# Patient Record
Sex: MALE | Race: WHITE | Employment: FULL TIME | URBAN - METROPOLITAN AREA
[De-identification: names, ages, dates, MRNs, and addresses within clinical notes are randomized per-mention and may not be internally consistent; named-entity substitution may affect disease eponyms.]

---

## 2022-01-17 ENCOUNTER — OFFICE VISIT (OUTPATIENT)
Dept: INTERNAL MEDICINE CLINIC | Age: 32
End: 2022-01-17
Payer: COMMERCIAL

## 2022-01-17 VITALS
BODY MASS INDEX: 29.19 KG/M2 | DIASTOLIC BLOOD PRESSURE: 92 MMHG | HEIGHT: 67 IN | RESPIRATION RATE: 14 BRPM | HEART RATE: 74 BPM | SYSTOLIC BLOOD PRESSURE: 148 MMHG | OXYGEN SATURATION: 98 % | WEIGHT: 186 LBS

## 2022-01-17 DIAGNOSIS — R06.83 SNORING: ICD-10-CM

## 2022-01-17 DIAGNOSIS — Z11.4 ENCOUNTER FOR SCREENING FOR HIV: ICD-10-CM

## 2022-01-17 DIAGNOSIS — Z13.1 SCREENING FOR DIABETES MELLITUS (DM): ICD-10-CM

## 2022-01-17 DIAGNOSIS — F41.9 ANXIETY: ICD-10-CM

## 2022-01-17 DIAGNOSIS — Z00.00 ANNUAL PHYSICAL EXAM: Primary | ICD-10-CM

## 2022-01-17 DIAGNOSIS — Z13.220 ENCOUNTER FOR LIPID SCREENING FOR CARDIOVASCULAR DISEASE: ICD-10-CM

## 2022-01-17 DIAGNOSIS — Z11.59 ENCOUNTER FOR HEPATITIS C SCREENING TEST FOR LOW RISK PATIENT: ICD-10-CM

## 2022-01-17 DIAGNOSIS — G47.19 EXCESSIVE DAYTIME SLEEPINESS: ICD-10-CM

## 2022-01-17 DIAGNOSIS — Z13.6 ENCOUNTER FOR LIPID SCREENING FOR CARDIOVASCULAR DISEASE: ICD-10-CM

## 2022-01-17 DIAGNOSIS — R53.83 OTHER FATIGUE: ICD-10-CM

## 2022-01-17 DIAGNOSIS — R03.0 ELEVATED BLOOD PRESSURE READING: ICD-10-CM

## 2022-01-17 DIAGNOSIS — Z20.822 SUSPECTED COVID-19 VIRUS INFECTION: ICD-10-CM

## 2022-01-17 PROCEDURE — 99385 PREV VISIT NEW AGE 18-39: CPT | Performed by: INTERNAL MEDICINE

## 2022-01-17 SDOH — ECONOMIC STABILITY: FOOD INSECURITY: WITHIN THE PAST 12 MONTHS, THE FOOD YOU BOUGHT JUST DIDN'T LAST AND YOU DIDN'T HAVE MONEY TO GET MORE.: NEVER TRUE

## 2022-01-17 SDOH — ECONOMIC STABILITY: FOOD INSECURITY: WITHIN THE PAST 12 MONTHS, YOU WORRIED THAT YOUR FOOD WOULD RUN OUT BEFORE YOU GOT MONEY TO BUY MORE.: NEVER TRUE

## 2022-01-17 ASSESSMENT — PATIENT HEALTH QUESTIONNAIRE - PHQ9
1. LITTLE INTEREST OR PLEASURE IN DOING THINGS: 0
SUM OF ALL RESPONSES TO PHQ9 QUESTIONS 1 & 2: 0
2. FEELING DOWN, DEPRESSED OR HOPELESS: 0
SUM OF ALL RESPONSES TO PHQ QUESTIONS 1-9: 0

## 2022-01-17 ASSESSMENT — LIFESTYLE VARIABLES: HOW OFTEN DO YOU HAVE A DRINK CONTAINING ALCOHOL: NEVER

## 2022-01-17 ASSESSMENT — SOCIAL DETERMINANTS OF HEALTH (SDOH): HOW HARD IS IT FOR YOU TO PAY FOR THE VERY BASICS LIKE FOOD, HOUSING, MEDICAL CARE, AND HEATING?: NOT HARD AT ALL

## 2022-01-17 NOTE — PROGRESS NOTES
History and Physical  [facility]  Internal Medicine  Charisma Lee MD      Aniket Buck  YOB: 1990    Date of Service:  1/17/2022    Vitals:    01/17/22 1036   BP: (!) 148/92   Pulse: 74   Resp: 14   SpO2: 98%   Weight: 186 lb (84.4 kg)   Height: 5' 7\" (1.702 m)     Body mass index is 29.13 kg/m². Wt Readings from Last 3 Encounters:   01/17/22 186 lb (84.4 kg)     BP Readings from Last 3 Encounters:   01/17/22 (!) 148/92        Chief Page Flor is a 27 y.o. male who presents for complete physical examination. Chief Complaint   Patient presents with    New Patient     [address] [place] [address], requesting blood work        HPI:     Takes 6mg of melatonin and some essential oils to help sleep which works. Lots of anxiety and difficulty falling asleep. Gets panic attacks at bedtime. Able to go back to sleep when wakes up in the middle of the night. Weight fluctuates a lot - thinks baseline weight is 155. Some fatigue during the day. Has a 3year old child. Does snore. There is no problem list on file for this patient. No Known Allergies  No outpatient medications have been marked as taking for the 1/17/22 encounter (Office Visit) with Noemi Pena MD.       History reviewed. No pertinent past medical history. History reviewed. No pertinent surgical history. Family History   Problem Relation Age of Onset    Hypothyroidism Mother     Diabetes Father        Review of Systems:  A comprehensive review of systems was negative except for what was noted in the HPI. Physical Exam  Constitutional:       General: He is not in acute distress. Appearance: Normal appearance. He is well-developed. He is not diaphoretic. HENT:      Head: Normocephalic and atraumatic.       Right Ear: Tympanic membrane, ear canal and external ear normal.      Left Ear: Tympanic membrane, ear canal and external ear normal.      Nose: Nose normal.      Mouth/Throat:      Mouth: Mucous membranes are moist.      Pharynx: Oropharynx is clear. No oropharyngeal exudate or posterior oropharyngeal erythema. Eyes:      General: No scleral icterus. Conjunctiva/sclera: Conjunctivae normal.      Pupils: Pupils are equal, round, and reactive to light. Neck:      Thyroid: No thyromegaly. Vascular: No carotid bruit or JVD. Comments: No JVD at 90 deg  No thyromegaly  Cardiovascular:      Rate and Rhythm: Normal rate and regular rhythm. Pulses: Normal pulses. Heart sounds: Normal heart sounds. No murmur heard. No friction rub. No gallop. Pulmonary:      Effort: Pulmonary effort is normal.      Breath sounds: Normal breath sounds. No wheezing or rales. Abdominal:      General: Bowel sounds are normal. There is no distension. Palpations: Abdomen is soft. There is no mass. Tenderness: There is no abdominal tenderness. There is no guarding or rebound. Musculoskeletal:         General: No tenderness or deformity. Normal range of motion. Cervical back: Normal range of motion and neck supple. Right lower leg: No edema. Left lower leg: No edema. Lymphadenopathy:      Cervical: No cervical adenopathy. Skin:     General: Skin is warm and dry. Findings: No lesion or rash. Neurological:      General: No focal deficit present. Mental Status: He is alert and oriented to person, place, and time. Cranial Nerves: No cranial nerve deficit (grossly intact). Sensory: No sensory deficit. Motor: No weakness. Coordination: Coordination normal.      Gait: Gait normal.   Psychiatric:         Mood and Affect: Mood normal.         Behavior: Behavior normal.         Lipid panel: No results found for: CHOL, TRIG, HDL, LDLCALC, LDLDIRECT  Glucose: No results found for: GLUCOSE    No results found for this visit on 01/17/22.      Preventive Care:  Self-testicular exams: yes  Eye exam within the past 2 years: yes  Dental exam every 6months: no - no oral health concerns. Exercise: active job  Social History     Tobacco Use    Smoking status: Never Smoker    Smokeless tobacco: Never Used   Substance Use Topics    Alcohol use: Not Currently     Social History     Substance and Sexual Activity   Sexual Activity Yes    Partners: Female       Advance Directive: N, <no information>      There is no immunization history on file for this patient. Health Maintenance   Topic Date Due    Hepatitis C screen  Never done    Varicella vaccine (1 of 2 - 2-dose childhood series) Never done    COVID-19 Vaccine (1) Never done    Depression Screen  Never done    HIV screen  Never done    DTaP/Tdap/Td vaccine (1 - Tdap) Never done    Flu vaccine (1) Never done    Hepatitis A vaccine  Aged Out    Hepatitis B vaccine  Aged Out    Hib vaccine  Aged Out    Meningococcal (ACWY) vaccine  Aged Out    Pneumococcal 0-64 years Vaccine  Aged Out          Assessment/Plan:  1. Annual physical exam  Care gaps identified and addressed  Declines flu shot and COVID IMZ (wife had and he wants antibodies checked. Still will not get IMZ). Labs as below  Hep C and HIV screening x1  Lipids and A1C  Monitor BP at home on cuff - close follow up. Watch salt intake. Given daytime sleepiness, snoring, recent weight gain and elevated blood pressure will refer for sleep eval for possible TISHA  Healthy diet and exercise. Mental health discussed - wants to avoid medications if possible. Discussed progressive relaxation techniques and even trying to find a CBT therapist as this would likely be effective (resources given). He is resistant to the idea of prescription medication for this. -     CBC Auto Differential; Future  -     COMPREHENSIVE METABOLIC PANEL; Future  -     Lipid Panel; Future  -     Hemoglobin A1C; Future  -     TSH with Reflex; Future  -     Vitamin B12; Future  -     Vitamin D 25 Hydroxy; Future  -     HEPATITIS C ANTIBODY;  Future  -     HIV-1 AND HIV-2 ANTIBODIES; Future  2. Other fatigue  -     CBC Auto Differential; Future  -     COMPREHENSIVE METABOLIC PANEL; Future  -     TSH with Reflex; Future  -     Vitamin B12; Future  -     Vitamin D 25 Hydroxy; Future  -     [facility]iki [facility]ase, [facility], [facility]  3. Anxiety  -     CBC Auto Differential; Future  -     COMPREHENSIVE METABOLIC PANEL; Future  -     TSH with Reflex; Future  -     Vitamin B12; Future  -     Vitamin D 25 Hydroxy; Future  4. Screening for diabetes mellitus (DM)  -     Hemoglobin A1C; Future  5. Encounter for lipid screening for cardiovascular disease  -     Lipid Panel; Future  6. Suspected COVID-19 virus infection  -     Covid-19, Antibody, Total; Future  7. Encounter for screening for HIV  -     HIV-1 AND HIV-2 ANTIBODIES; Future  8. Encounter for hepatitis C screening test for low risk patient  -     HEPATITIS C ANTIBODY; Future  9. Excessive daytime sleepiness  -     Viki Aase, MD, [facility]  10. Snoring  -     Viki Aase, MD, [facility]  11. Elevated blood pressure reading  -     Viki Aase, MD, [facility]       Return in about 3 months (around 4/17/2022) for HTN.

## 2022-01-17 NOTE — PATIENT INSTRUCTIONS
Watch salt in diet  Monitor blood pressure at home - keep readings    Guided meditations or progressive relaxation techniques - can look up on youtube    [email or web address] - search for psychotherapist and plug in insurance information

## 2023-07-25 ENCOUNTER — OFFICE VISIT (OUTPATIENT)
Dept: INTERNAL MEDICINE CLINIC | Age: 33
End: 2023-07-25
Payer: COMMERCIAL

## 2023-07-25 VITALS
HEIGHT: 67 IN | RESPIRATION RATE: 14 BRPM | SYSTOLIC BLOOD PRESSURE: 112 MMHG | DIASTOLIC BLOOD PRESSURE: 78 MMHG | BODY MASS INDEX: 27.94 KG/M2 | OXYGEN SATURATION: 98 % | HEART RATE: 76 BPM | WEIGHT: 178 LBS

## 2023-07-25 DIAGNOSIS — Z13.1 SCREENING FOR DIABETES MELLITUS (DM): ICD-10-CM

## 2023-07-25 DIAGNOSIS — Z13.220 ENCOUNTER FOR LIPID SCREENING FOR CARDIOVASCULAR DISEASE: ICD-10-CM

## 2023-07-25 DIAGNOSIS — R53.83 OTHER FATIGUE: Primary | ICD-10-CM

## 2023-07-25 DIAGNOSIS — Z13.6 ENCOUNTER FOR LIPID SCREENING FOR CARDIOVASCULAR DISEASE: ICD-10-CM

## 2023-07-25 DIAGNOSIS — R53.83 OTHER FATIGUE: ICD-10-CM

## 2023-07-25 PROCEDURE — 99214 OFFICE O/P EST MOD 30 MIN: CPT | Performed by: INTERNAL MEDICINE

## 2023-07-25 SDOH — ECONOMIC STABILITY: INCOME INSECURITY: HOW HARD IS IT FOR YOU TO PAY FOR THE VERY BASICS LIKE FOOD, HOUSING, MEDICAL CARE, AND HEATING?: NOT HARD AT ALL

## 2023-07-25 SDOH — ECONOMIC STABILITY: FOOD INSECURITY: WITHIN THE PAST 12 MONTHS, THE FOOD YOU BOUGHT JUST DIDN'T LAST AND YOU DIDN'T HAVE MONEY TO GET MORE.: NEVER TRUE

## 2023-07-25 SDOH — ECONOMIC STABILITY: HOUSING INSECURITY
IN THE LAST 12 MONTHS, WAS THERE A TIME WHEN YOU DID NOT HAVE A STEADY PLACE TO SLEEP OR SLEPT IN A SHELTER (INCLUDING NOW)?: NO

## 2023-07-25 SDOH — ECONOMIC STABILITY: FOOD INSECURITY: WITHIN THE PAST 12 MONTHS, YOU WORRIED THAT YOUR FOOD WOULD RUN OUT BEFORE YOU GOT MONEY TO BUY MORE.: NEVER TRUE

## 2023-07-25 ASSESSMENT — PATIENT HEALTH QUESTIONNAIRE - PHQ9
SUM OF ALL RESPONSES TO PHQ QUESTIONS 1-9: 0
1. LITTLE INTEREST OR PLEASURE IN DOING THINGS: 0
SUM OF ALL RESPONSES TO PHQ QUESTIONS 1-9: 0
SUM OF ALL RESPONSES TO PHQ QUESTIONS 1-9: 0
2. FEELING DOWN, DEPRESSED OR HOPELESS: 0
SUM OF ALL RESPONSES TO PHQ QUESTIONS 1-9: 0
SUM OF ALL RESPONSES TO PHQ9 QUESTIONS 1 & 2: 0

## 2023-07-25 NOTE — PROGRESS NOTES
Right Ear: External ear normal.      Left Ear: External ear normal.      Nose: Nose normal.      Mouth/Throat:      Mouth: Mucous membranes are moist.      Pharynx: Oropharynx is clear. Eyes:      General: No scleral icterus. Conjunctiva/sclera: Conjunctivae normal.   Cardiovascular:      Rate and Rhythm: Normal rate and regular rhythm. Pulses: Normal pulses. Heart sounds: Normal heart sounds. No murmur heard. No friction rub. No gallop. Pulmonary:      Effort: Pulmonary effort is normal.      Breath sounds: Normal breath sounds. No wheezing, rhonchi or rales. Abdominal:      General: Abdomen is flat. Bowel sounds are normal.      Palpations: Abdomen is soft. Musculoskeletal:         General: Normal range of motion. Cervical back: Normal range of motion and neck supple. Right lower leg: No edema. Left lower leg: No edema. Skin:     General: Skin is warm and dry. Findings: No rash. Neurological:      General: No focal deficit present. Mental Status: He is alert. Mental status is at baseline. Coordination: Coordination normal.      Gait: Gait normal.   Psychiatric:         Mood and Affect: Mood normal.         Behavior: Behavior normal.         On this date 7/25/2023 I have spent 30 minutes reviewing previous notes, test results and face to face with the patient discussing the diagnosis and importance of compliance with the treatment plan as well as documenting on the day of the visit. An electronic signature was used to authenticate this note.     --James Portillo MD

## 2023-07-26 LAB
25(OH)D3 SERPL-MCNC: 27.2 NG/ML
ALBUMIN SERPL-MCNC: 4.8 G/DL (ref 3.4–5)
ALBUMIN/GLOB SERPL: 1.8 {RATIO} (ref 1.1–2.2)
ALP SERPL-CCNC: 71 U/L (ref 40–129)
ALT SERPL-CCNC: 20 U/L (ref 10–40)
ANION GAP SERPL CALCULATED.3IONS-SCNC: 11 MMOL/L (ref 3–16)
AST SERPL-CCNC: 24 U/L (ref 15–37)
BASOPHILS # BLD: 0.1 K/UL (ref 0–0.2)
BASOPHILS NFR BLD: 0.8 %
BILIRUB SERPL-MCNC: 0.3 MG/DL (ref 0–1)
BUN SERPL-MCNC: 17 MG/DL (ref 7–20)
CALCIUM SERPL-MCNC: 10.1 MG/DL (ref 8.3–10.6)
CHLORIDE SERPL-SCNC: 106 MMOL/L (ref 99–110)
CHOLEST SERPL-MCNC: 221 MG/DL (ref 0–199)
CO2 SERPL-SCNC: 27 MMOL/L (ref 21–32)
CREAT SERPL-MCNC: 1.3 MG/DL (ref 0.9–1.3)
DEPRECATED RDW RBC AUTO: 14.5 % (ref 12.4–15.4)
EOSINOPHIL # BLD: 0.2 K/UL (ref 0–0.6)
EOSINOPHIL NFR BLD: 2.4 %
EST. AVERAGE GLUCOSE BLD GHB EST-MCNC: 102.5 MG/DL
GFR SERPLBLD CREATININE-BSD FMLA CKD-EPI: >60 ML/MIN/{1.73_M2}
GLUCOSE SERPL-MCNC: 92 MG/DL (ref 70–99)
HBA1C MFR BLD: 5.2 %
HCT VFR BLD AUTO: 46.5 % (ref 40.5–52.5)
HDLC SERPL-MCNC: 59 MG/DL (ref 40–60)
HGB BLD-MCNC: 15.7 G/DL (ref 13.5–17.5)
LDLC SERPL CALC-MCNC: 128 MG/DL
LYMPHOCYTES # BLD: 1.7 K/UL (ref 1–5.1)
LYMPHOCYTES NFR BLD: 27.2 %
MCH RBC QN AUTO: 27.6 PG (ref 26–34)
MCHC RBC AUTO-ENTMCNC: 33.7 G/DL (ref 31–36)
MCV RBC AUTO: 81.7 FL (ref 80–100)
MONOCYTES # BLD: 0.6 K/UL (ref 0–1.3)
MONOCYTES NFR BLD: 9.2 %
NEUTROPHILS # BLD: 3.9 K/UL (ref 1.7–7.7)
NEUTROPHILS NFR BLD: 60.4 %
PLATELET # BLD AUTO: 258 K/UL (ref 135–450)
PMV BLD AUTO: 8.7 FL (ref 5–10.5)
POTASSIUM SERPL-SCNC: 4.3 MMOL/L (ref 3.5–5.1)
PROT SERPL-MCNC: 7.5 G/DL (ref 6.4–8.2)
RBC # BLD AUTO: 5.69 M/UL (ref 4.2–5.9)
SODIUM SERPL-SCNC: 144 MMOL/L (ref 136–145)
TRIGL SERPL-MCNC: 170 MG/DL (ref 0–150)
TSH SERPL DL<=0.005 MIU/L-ACNC: 1.06 UIU/ML (ref 0.27–4.2)
VIT B12 SERPL-MCNC: 502 PG/ML (ref 211–911)
VLDLC SERPL CALC-MCNC: 34 MG/DL
WBC # BLD AUTO: 6.4 K/UL (ref 4–11)

## 2023-08-27 ENCOUNTER — PATIENT MESSAGE (OUTPATIENT)
Dept: INTERNAL MEDICINE CLINIC | Age: 33
End: 2023-08-27

## 2023-08-27 DIAGNOSIS — R19.7 DIARRHEA, UNSPECIFIED TYPE: Primary | ICD-10-CM

## 2023-09-05 DIAGNOSIS — R19.7 DIARRHEA, UNSPECIFIED TYPE: Primary | ICD-10-CM

## 2023-09-05 NOTE — PROGRESS NOTES
[facility] ENDOSCOPY COLONOSCOPY PRE-OPERATIVE INSTRUCTIONS    Procedure date_9/7/2023________  Arrival time___1030_________          Surgery time___1130_________       Clear liquids the day before the procedure. Do not eat or drink anything within 5 hours of your procedure. This includes water chewing gum, mints and ice chips. You may brush your teeth and gargle the morning of your surgery, but do not swallow the water    You may be asked to stop blood thinners such as Coumadin, Plavix, Fragmin, Lovenox, etc., or any anti-inflammatories such as:  Aspirin, Ibuprofen, Advil, Naproxen prior to your procedure. We also ask that you stop any OTC medications such as fish oil, vitamin E, glucosamine, garlic, Multivitamins, COQ 10, etc.    You must make arrangements for a responsible adult to arrive with you and stay in our waiting area during your procedure. They will also need to take you home after your procedure. For your safety you will not be allowed to leave alone or drive yourself home. Also for your safety, it is strongly suggested that someone stay with you the first 24 hours after your procedure. For your comfort, please wear simple loose fitting clothing to the center. Please do not bring valuables. If you have a living will and a durable power of  for healthcare, please bring in a copy.      You will need to bring a photo ID and insurance card    Our goal is to provide you with excellent care so if you have any questions, please contact us at the [facility] at [phone]         Please note these are generalized instructions for all colonoscopy cases, you may be provided with more specific instructions if necessary

## 2023-09-06 ENCOUNTER — ANESTHESIA EVENT (OUTPATIENT)
Dept: ENDOSCOPY | Age: 33
End: 2023-09-06
Payer: COMMERCIAL

## 2023-09-07 ENCOUNTER — HOSPITAL ENCOUNTER (OUTPATIENT)
Age: 33
Setting detail: OUTPATIENT SURGERY
Discharge: HOME | End: 2023-09-07
Attending: INTERNAL MEDICINE | Admitting: INTERNAL MEDICINE
Payer: COMMERCIAL

## 2023-09-07 ENCOUNTER — ANESTHESIA (OUTPATIENT)
Dept: ENDOSCOPY | Age: 33
End: 2023-09-07
Payer: COMMERCIAL

## 2023-09-07 VITALS
WEIGHT: 169.8 LBS | SYSTOLIC BLOOD PRESSURE: 108 MMHG | HEART RATE: 62 BPM | TEMPERATURE: 96.8 F | DIASTOLIC BLOOD PRESSURE: 60 MMHG | OXYGEN SATURATION: 97 % | RESPIRATION RATE: 16 BRPM | BODY MASS INDEX: 27.29 KG/M2 | HEIGHT: 66 IN

## 2023-09-07 DIAGNOSIS — K62.5 RECTAL BLEEDING: ICD-10-CM

## 2023-09-07 PROCEDURE — 2500000003 HC RX 250 WO HCPCS

## 2023-09-07 PROCEDURE — 3700000000 HC ANESTHESIA ATTENDED CARE: Performed by: INTERNAL MEDICINE

## 2023-09-07 PROCEDURE — 3609010300 HC COLONOSCOPY W/BIOPSY SINGLE/MULTIPLE: Performed by: INTERNAL MEDICINE

## 2023-09-07 PROCEDURE — 3700000001 HC ADD 15 MINUTES (ANESTHESIA): Performed by: INTERNAL MEDICINE

## 2023-09-07 PROCEDURE — 2709999900 HC NON-CHARGEABLE SUPPLY: Performed by: INTERNAL MEDICINE

## 2023-09-07 PROCEDURE — 88305 TISSUE EXAM BY PATHOLOGIST: CPT

## 2023-09-07 PROCEDURE — 7100000010 HC PHASE II RECOVERY - FIRST 15 MIN: Performed by: INTERNAL MEDICINE

## 2023-09-07 PROCEDURE — 2580000003 HC RX 258: Performed by: ANESTHESIOLOGY

## 2023-09-07 PROCEDURE — 6360000002 HC RX W HCPCS

## 2023-09-07 PROCEDURE — 7100000011 HC PHASE II RECOVERY - ADDTL 15 MIN: Performed by: INTERNAL MEDICINE

## 2023-09-07 RX ORDER — SODIUM CHLORIDE 9 MG/ML
INJECTION, SOLUTION INTRAVENOUS PRN
Status: CANCELLED | OUTPATIENT
Start: 2023-09-07

## 2023-09-07 RX ORDER — SODIUM CHLORIDE 0.9 % (FLUSH) 0.9 %
5-40 SYRINGE (ML) INJECTION EVERY 12 HOURS SCHEDULED
Status: DISCONTINUED | OUTPATIENT
Start: 2023-09-07 | End: 2023-09-07 | Stop reason: HOSPADM

## 2023-09-07 RX ORDER — SODIUM CHLORIDE 0.9 % (FLUSH) 0.9 %
5-40 SYRINGE (ML) INJECTION EVERY 12 HOURS SCHEDULED
Status: CANCELLED | OUTPATIENT
Start: 2023-09-07

## 2023-09-07 RX ORDER — DIPHENHYDRAMINE HYDROCHLORIDE 50 MG/ML
12.5 INJECTION INTRAMUSCULAR; INTRAVENOUS
Status: CANCELLED | OUTPATIENT
Start: 2023-09-07 | End: 2023-09-08

## 2023-09-07 RX ORDER — SODIUM CHLORIDE 9 MG/ML
INJECTION, SOLUTION INTRAVENOUS PRN
Status: DISCONTINUED | OUTPATIENT
Start: 2023-09-07 | End: 2023-09-07 | Stop reason: HOSPADM

## 2023-09-07 RX ORDER — ONDANSETRON 2 MG/ML
4 INJECTION INTRAMUSCULAR; INTRAVENOUS
Status: CANCELLED | OUTPATIENT
Start: 2023-09-07 | End: 2023-09-08

## 2023-09-07 RX ORDER — PROPOFOL 10 MG/ML
INJECTION, EMULSION INTRAVENOUS PRN
Status: DISCONTINUED | OUTPATIENT
Start: 2023-09-07 | End: 2023-09-07 | Stop reason: SDUPTHER

## 2023-09-07 RX ORDER — PROPOFOL 10 MG/ML
INJECTION, EMULSION INTRAVENOUS CONTINUOUS PRN
Status: DISCONTINUED | OUTPATIENT
Start: 2023-09-07 | End: 2023-09-07 | Stop reason: SDUPTHER

## 2023-09-07 RX ORDER — LIDOCAINE HYDROCHLORIDE 20 MG/ML
INJECTION, SOLUTION EPIDURAL; INFILTRATION; INTRACAUDAL; PERINEURAL PRN
Status: DISCONTINUED | OUTPATIENT
Start: 2023-09-07 | End: 2023-09-07 | Stop reason: SDUPTHER

## 2023-09-07 RX ORDER — SODIUM CHLORIDE 0.9 % (FLUSH) 0.9 %
5-40 SYRINGE (ML) INJECTION PRN
Status: DISCONTINUED | OUTPATIENT
Start: 2023-09-07 | End: 2023-09-07 | Stop reason: HOSPADM

## 2023-09-07 RX ORDER — SODIUM CHLORIDE 0.9 % (FLUSH) 0.9 %
5-40 SYRINGE (ML) INJECTION PRN
Status: CANCELLED | OUTPATIENT
Start: 2023-09-07

## 2023-09-07 RX ADMIN — PROPOFOL 100 MG: 10 INJECTION, EMULSION INTRAVENOUS at 10:45

## 2023-09-07 RX ADMIN — PROPOFOL 50 MG: 10 INJECTION, EMULSION INTRAVENOUS at 10:57

## 2023-09-07 RX ADMIN — PROPOFOL 50 MG: 10 INJECTION, EMULSION INTRAVENOUS at 10:49

## 2023-09-07 RX ADMIN — PROPOFOL 50 MG: 10 INJECTION, EMULSION INTRAVENOUS at 10:54

## 2023-09-07 RX ADMIN — PROPOFOL 50 MG: 10 INJECTION, EMULSION INTRAVENOUS at 10:47

## 2023-09-07 RX ADMIN — PROPOFOL 50 MG: 10 INJECTION, EMULSION INTRAVENOUS at 10:52

## 2023-09-07 RX ADMIN — PROPOFOL 150 MCG/KG/MIN: 10 INJECTION, EMULSION INTRAVENOUS at 10:45

## 2023-09-07 RX ADMIN — SODIUM CHLORIDE: 9 INJECTION, SOLUTION INTRAVENOUS at 10:40

## 2023-09-07 RX ADMIN — LIDOCAINE HYDROCHLORIDE 100 MG: 20 INJECTION, SOLUTION EPIDURAL; INFILTRATION; INTRACAUDAL; PERINEURAL at 10:45

## 2023-09-07 ASSESSMENT — LIFESTYLE VARIABLES: SMOKING_STATUS: 0

## 2023-09-07 ASSESSMENT — ENCOUNTER SYMPTOMS: SHORTNESS OF BREATH: 0

## 2023-09-07 ASSESSMENT — PAIN SCALES - WONG BAKER
WONGBAKER_NUMERICALRESPONSE: 0
WONGBAKER_NUMERICALRESPONSE: 0

## 2023-09-07 ASSESSMENT — PAIN - FUNCTIONAL ASSESSMENT: PAIN_FUNCTIONAL_ASSESSMENT: NONE - DENIES PAIN

## 2023-09-07 NOTE — ANESTHESIA POSTPROCEDURE EVALUATION
Department of Anesthesiology  Postprocedure Note    Patient: Yenny De Anda  MRN: [id number]  YOB: 1990  Date of evaluation: 9/7/2023      Procedure Summary     Date: 09/07/23 Room / Location: [address] [place] [address]    Anesthesia Start: 2934 Anesthesia Stop: 7075    Procedure: COLONOSCOPY WITH BIOPSY Diagnosis:       Rectal bleeding      (Rectal bleeding [K62.5])    Surgeons: Lang Swenson MD Responsible Provider: Mickey Doyle MD    Anesthesia Type: MAC ASA Status: 2          Anesthesia Type: No value filed.     Gladys Phase I: Gladys Score: 10    Gladys Phase II: Gladys Score: 10      Anesthesia Post Evaluation    Patient location during evaluation: bedside  Patient participation: complete - patient participated  Level of consciousness: awake and alert  Pain score: 0  Nausea & Vomiting: no nausea  Complications: no  Cardiovascular status: hemodynamically stable  Respiratory status: acceptable  Hydration status: stable  Pain management: adequate

## 2023-09-07 NOTE — PROCEDURES
Colonoscopy Procedure  Note          Patient: Padmini Gupta  : 1990      Procedure: Colonoscopy with biopsies    Date:  2023    Primary Care Physician: Bebe Etienne MD     Operative surgeon: Missy Recinos MD  Previous Colonoscopy: None  Consent: I explained and discussed the risk, benefits and alternatives for the procedure with the patient and obtained the patient's consent for the procedure. We discussed the specific risks including bleeding, perforation, post-procedure abdominal pain, and missed lesions which could lead to interval colorectal cancers. History:     History reviewed. No pertinent past medical history. Preoperative Diagnosis: Rectal bleeding [K62.5]  Post Operative Diagnosis: Ulcerative rectosigmoiditis  ASA: 2  SEDATION: MAC      Procedures Performed: Colonoscopy   Scope Type: Pediatric    Procedure Details:      With the patient in left lateral decubitus position the endoscope was inserted through the anorectal area into the rectum. The scope was then advanced through the length of the colon to the cecum and terminal ileum. The quality of preparation was good. The scope was carefully withdrawn with careful inspection. Images were taken of the multiple segments of colon, cecum, IC valve, rectum, terminal ileum. Retroflexion performed in the rectum due to severe colitis and proctitis      Cecum Intubated: Yes  EBL: minimal to none  Complications:  no complications were noted  Post-operative Findings: Perianal exam unremarkable, digital rectal exam unremarkable, retroflexion in the rectum was not performed due to severe proctitis. Upon entering the scope patient had severe Wolfe 3 colitis and proctitis extending to about 35 cm consistent with ulcerative rectosigmoiditis. Biopsies were taken of the segment to further assess    The remaining colon was endoscopically normal.  Biopsies of the right colon were taken to further assess.     Plan: Likely new diagnosis of

## 2023-09-07 NOTE — DISCHARGE INSTRUCTIONS
Endoscopy Discharge Instructions    Call with any questions or concerns. You may be drowsy or lightheaded after receiving sedation. DO NOT operate  a vehicle (automobile, bicycle, motorcycle, machinery, or power tools), no  alcoholic beverages, and do not make any important decisions today. Plan on bed rest or quiet relaxation today. Resume normal activities in the morning. Resume normal activity tomorrow unless otherwise advised by your physician. Eat a light first meal, avoiding spicy and fatty foods, then resume normal diet unless you are told otherwise by your physician. If the intravenous medication site is painful, apply warm compresses on the site until the soreness is relieved and elevate the arm above the heart. Call your physician if no improvement  in 2-3 days. POSSIBLE SYMPTOMS TO WATCH:     1. fever (greater than 100) 5. increased abdominal bloating   2. severe pain   6. excessive bleeding   3. nausea and vomiting  7. chest pain   4. chills    8. shortness of breath       Notify if these problems occur     Expected as normal and remedies:  Sore throat: use over the counter throat lozenges or gargle with warm salt water. Redness or soreness at the IV site: apply warm compress  Gaseous discomfort: belching or passing flatus (gas). Kristi Gandara MD    Endoscopically there you appear to have ulcerative colitis involving the left side of your colon. This is a type of inflammatory bowel disease. Ordered a medication called mesalamine or Rowasa enemas to be done nightly. This will apply a anti-inflammatory to the inflamed segment of your bowel and hopefully give you a good response. We are going to see you back in 1 month to see how you are tolerating the medication. I have also attached below the Crohn's and colitis website for more information about inflammatory bowel disease and your new diagnosis.  We did take biopsies today which will likely show

## 2024-01-08 DIAGNOSIS — K51.311 ULCERATIVE RECTOSIGMOIDITIS WITH RECTAL BLEEDING (HCC): Primary | ICD-10-CM

## 2024-01-16 DIAGNOSIS — K51.311 ULCERATIVE RECTOSIGMOIDITIS WITH RECTAL BLEEDING (HCC): ICD-10-CM

## 2024-01-17 LAB
BASOPHILS # BLD: 0.1 K/UL (ref 0–0.2)
BASOPHILS NFR BLD: 0.7 %
CRP SERPL-MCNC: <3 MG/L (ref 0–5.1)
DEPRECATED RDW RBC AUTO: 14.6 % (ref 12.4–15.4)
EOSINOPHIL # BLD: 0.3 K/UL (ref 0–0.6)
EOSINOPHIL NFR BLD: 3.1 %
FERRITIN SERPL IA-MCNC: 22.4 NG/ML (ref 30–400)
HCT VFR BLD AUTO: 44.9 % (ref 40.5–52.5)
HGB BLD-MCNC: 14.9 G/DL (ref 13.5–17.5)
IRON SATN MFR SERPL: 23 % (ref 20–50)
IRON SERPL-MCNC: 77 UG/DL (ref 59–158)
LYMPHOCYTES # BLD: 2.2 K/UL (ref 1–5.1)
LYMPHOCYTES NFR BLD: 23.2 %
MCH RBC QN AUTO: 27.4 PG (ref 26–34)
MCHC RBC AUTO-ENTMCNC: 33.2 G/DL (ref 31–36)
MCV RBC AUTO: 82.5 FL (ref 80–100)
MONOCYTES # BLD: 1.1 K/UL (ref 0–1.3)
MONOCYTES NFR BLD: 11.4 %
NEUTROPHILS # BLD: 5.9 K/UL (ref 1.7–7.7)
NEUTROPHILS NFR BLD: 61.6 %
PLATELET # BLD AUTO: 213 K/UL (ref 135–450)
PMV BLD AUTO: 9.4 FL (ref 5–10.5)
RBC # BLD AUTO: 5.44 M/UL (ref 4.2–5.9)
TIBC SERPL-MCNC: 329 UG/DL (ref 260–445)
WBC # BLD AUTO: 9.6 K/UL (ref 4–11)

## 2024-01-18 DIAGNOSIS — K51.311 ULCERATIVE RECTOSIGMOIDITIS WITH RECTAL BLEEDING (HCC): ICD-10-CM

## 2024-01-20 LAB — CALPROTECTIN STL-MCNT: 1810 UG/G

## 2024-01-31 DIAGNOSIS — K51.319 ULCERATIVE RECTOSIGMOIDITIS WITH COMPLICATION (HCC): Primary | ICD-10-CM

## 2024-02-02 DIAGNOSIS — K51.319 ULCERATIVE RECTOSIGMOIDITIS WITH COMPLICATION (HCC): ICD-10-CM

## 2024-02-03 LAB — HBV SURFACE AG SERPL QL IA: NORMAL

## 2024-02-05 LAB
GAMMA INTERFERON BACKGROUND BLD IA-ACNC: 0.04 IU/ML
MITOGEN IGNF BCKGRD COR BLD-ACNC: 8.45 IU/ML
QUANTI TB GOLD PLUS: NEGATIVE
QUANTI TB1 MINUS NIL: 0.02 IU/ML (ref 0–0.34)
QUANTI TB2 MINUS NIL: 0 IU/ML (ref 0–0.34)

## 2024-02-06 DIAGNOSIS — K51.319 ULCERATIVE RECTOSIGMOIDITIS WITH COMPLICATION (HCC): ICD-10-CM

## 2024-02-07 LAB
C DIFF TOX A+B STL QL IA: NORMAL
CRYPTOSP AG STL QL IA: NORMAL
G LAMBLIA AG STL QL IA: NORMAL

## 2024-02-08 LAB — GI PATHOGENS PNL STL NAA+PROBE: NORMAL

## 2025-08-04 DIAGNOSIS — K51.311 ULCERATIVE RECTOSIGMOIDITIS WITH RECTAL BLEEDING (HCC): Primary | ICD-10-CM

## (undated) DEVICE — FORCEPS BX 240CM 2.4MM L NDL RAD JAW 4 M00513334